# Patient Record
Sex: FEMALE | Race: WHITE | URBAN - METROPOLITAN AREA
[De-identification: names, ages, dates, MRNs, and addresses within clinical notes are randomized per-mention and may not be internally consistent; named-entity substitution may affect disease eponyms.]

---

## 2017-11-16 ENCOUNTER — HISTORICAL (OUTPATIENT)
Dept: ADMINISTRATIVE | Facility: HOSPITAL | Age: 32
End: 2017-11-16

## 2017-11-16 LAB
APPEARANCE, UA: ABNORMAL
BACTERIA SPEC CULT: ABNORMAL /HPF
BILIRUB UR QL STRIP: NEGATIVE
COLOR UR: ABNORMAL
GLUCOSE (UA): NEGATIVE
GROUP & RH: NORMAL
HGB UR QL STRIP: NEGATIVE
KETONES UR QL STRIP: NEGATIVE
LEUKOCYTE ESTERASE UR QL STRIP: NEGATIVE
NITRITE UR QL STRIP: NEGATIVE
PH UR STRIP: 7 [PH] (ref 5–9)
PROT UR QL STRIP: NEGATIVE
RBC #/AREA URNS HPF: ABNORMAL /[HPF]
SP GR UR STRIP: 1.03 (ref 1–1.03)
SQUAMOUS EPITHELIAL, UA: 20 /HPF (ref 0–4)
UROBILINOGEN UR STRIP-ACNC: 2
WBC #/AREA URNS HPF: ABNORMAL /[HPF]

## 2017-11-18 LAB — FINAL CULTURE: NO GROWTH

## 2018-01-19 ENCOUNTER — HISTORICAL (OUTPATIENT)
Dept: ADMINISTRATIVE | Facility: HOSPITAL | Age: 33
End: 2018-01-19

## 2018-01-22 LAB — FINAL CULTURE: NORMAL

## 2018-04-16 ENCOUNTER — HISTORICAL (OUTPATIENT)
Dept: ADMINISTRATIVE | Facility: HOSPITAL | Age: 33
End: 2018-04-16

## 2018-04-18 LAB — FINAL CULTURE: NORMAL

## 2022-04-30 NOTE — OP NOTE
DATE OF SURGERY:        SURGEON:  Crow Hawkins MD    OPERATION:  Seaman cervical cerclage.    ESTIMATED BLOOD LOSS:  Less than 10 cc of blood.    ANESTHESIA:  Spinal    PROCEDURE IN DETAIL:  The patient was prepped and draped in a sterile manner and placed in the lithotomy position for cervical cerclage procedure.  Under direct visualization with 2 assistants using retractors, the cervix was visualized and Seaman cervical cerclage suture was placed starting at 11 o'clock and finishing at 1 o'clock using #5 Ethibond suture and going as high up as cervical vaginal junction and including cervical stroma with each suture.  One such suture was placed and tied around 12 o'clock.  The patient tolerated the procedure well and preoperative  and postoperative fetal heart rate were normal.  Vaginal and rectal examinations were confirmatory.  The patient was given the usual postoperative instructions and will be discharged home once the spinal effect is resolved.        ______________________________  MD KELLY Minor/JOBY  DD:  11/16/2017  Time:  11:35AM  DT:  11/17/2017  Time:  08:35AM  Job #:  [id number]    cc: Dr. Joya